# Patient Record
Sex: MALE | Race: WHITE | URBAN - METROPOLITAN AREA
[De-identification: names, ages, dates, MRNs, and addresses within clinical notes are randomized per-mention and may not be internally consistent; named-entity substitution may affect disease eponyms.]

---

## 2017-11-08 ENCOUNTER — OFFICE VISIT (OUTPATIENT)
Dept: URGENT CARE | Facility: URGENT CARE | Age: 22
End: 2017-11-08
Payer: COMMERCIAL

## 2017-11-08 VITALS
DIASTOLIC BLOOD PRESSURE: 80 MMHG | HEIGHT: 69 IN | BODY MASS INDEX: 28.88 KG/M2 | HEART RATE: 85 BPM | TEMPERATURE: 97.2 F | WEIGHT: 195 LBS | SYSTOLIC BLOOD PRESSURE: 122 MMHG | RESPIRATION RATE: 14 BRPM

## 2017-11-08 DIAGNOSIS — S61.012A THUMB LACERATION, LEFT, INITIAL ENCOUNTER: Primary | ICD-10-CM

## 2017-11-08 PROCEDURE — 12001 RPR S/N/AX/GEN/TRNK 2.5CM/<: CPT | Performed by: FAMILY MEDICINE

## 2017-11-08 NOTE — MR AVS SNAPSHOT
"              After Visit Summary   2017    Pardeep Agosto    MRN: [id number]           Patient Information     Date Of Birth          1995        Visit Information        Provider Department      2017 8:00 PM Chris Lowry MD [facility]        Today's Diagnoses     Thumb laceration, left, initial encounter    -  1       Follow-ups after your visit        Who to contact     If you have questions or need follow up information about today's clinic visit or your schedule please contact [facility] directly at [phone].  Normal or non-critical lab and imaging results will be communicated to you by [employer], letter or phone within 4 business days after the clinic has received the results. If you do not hear from us within 7 days, please contact the clinic through [employer] or phone. If you have a critical or abnormal lab result, we will notify you by phone as soon as possible.  Submit refill requests through [employer] or call your pharmacy and they will forward the refill request to us. Please allow 3 business days for your refill to be completed.          Additional Information About Your Visit        MyChart Information     [employer] lets you send messages to your doctor, view your test results, renew your prescriptions, schedule appointments and more. To sign up, go to [email or web address] . Click on \"Log in\" on the left side of the screen, which will take you to the Welcome page. Then click on \"Sign up Now\" on the right side of the page.     You will be asked to enter the access code listed below, as well as some personal information. Please follow the directions to create your username and password.     Your access code is: [email or web address]  Expires: 2018  8:29 PM     Your access code will  in 90 days. If you need help or a new code, please call your [place] clinic or [phone].        Care EveryWhere ID     This is your Care EveryWhere " "ID. This could be used by other organizations to access your [place] medical records  [id number]        Your Vitals Were     Pulse Temperature Respirations Height BMI (Body Mass Index)       85 97.2  F (36.2  C) (Oral) 14 5' 9\" (1.753 m) 28.8 kg/m2        Blood Pressure from Last 3 Encounters:   11/08/17 122/80    Weight from Last 3 Encounters:   11/08/17 195 lb (88.5 kg)              We Performed the Following     REPAIR SUPERFICIAL, WOUND BODY < =2.5CM        Primary Care Provider Office Phone # Fax #    [facility] [phone] [address] [address]        Equal Access to Services     ELDER VEGA : Hadii daniel franklino Socaro, waaxda luqadaha, qaybta kaalmada adeegyada, guera [place] . So [facility] [phone].    ATENCIÓN: Si habla español, tiene a keating disposición servicios gratuitos de asistencia lingüística. [facility] [phone].    We comply with applicable federal civil rights laws and [place] laws. We do not discriminate on the basis of race, color, national origin, age, disability, sex, sexual orientation, or gender identity.            Thank you!     Thank you for choosing [facility]  for your care. Our goal is always to provide you with excellent care. Hearing back from our patients is one way we can continue to improve our services. Please take a few minutes to complete the written survey that you may receive in the mail after your visit with us. Thank you!             Your Updated Medication List - Protect others around you: Learn how to safely use, store and throw away your medicines at www.disposemymeds.org.      Notice  As of 11/8/2017  8:29 PM    You have not been prescribed any medications.      "

## 2017-11-09 NOTE — NURSING NOTE
"Chief Complaint   Patient presents with     Urgent Care     Laceration     cut to left thumb about 7pm tonight, cutting onions       Initial /80  Pulse 85  Temp 97.2  F (36.2  C) (Oral)  Resp 14  Ht 5' 9\" (1.753 m)  Wt 195 lb (88.5 kg)  BMI 28.8 kg/m2 Estimated body mass index is 28.8 kg/(m^2) as calculated from the following:    Height as of this encounter: 5' 9\" (1.753 m).    Weight as of this encounter: 195 lb (88.5 kg).  Medication Reconciliation: complete  "

## 2017-11-09 NOTE — PROGRESS NOTES
Subjective: Cut tip of left thumb slicing vegetables tonight, tetanus shot was 8 years ago, offered a booster today but he is fine with waiting.    Objective: There is about a 2-1/2 cm laceration to the thumb it is relatively superficial. We used Dermabond to close the laceration with good success and use Steri-Strips to hold it together.    Assessment and plan: Left thumb laceration, closed with superglue.